# Patient Record
Sex: FEMALE | Race: WHITE | Employment: FULL TIME
[De-identification: names, ages, dates, MRNs, and addresses within clinical notes are randomized per-mention and may not be internally consistent; named-entity substitution may affect disease eponyms.]

---

## 2018-01-20 ENCOUNTER — HEALTH MAINTENANCE LETTER (OUTPATIENT)
Age: 38
End: 2018-01-20

## 2019-09-29 ENCOUNTER — HEALTH MAINTENANCE LETTER (OUTPATIENT)
Age: 39
End: 2019-09-29

## 2020-03-15 ENCOUNTER — HEALTH MAINTENANCE LETTER (OUTPATIENT)
Age: 40
End: 2020-03-15

## 2020-10-09 ENCOUNTER — HOSPITAL ENCOUNTER (EMERGENCY)
Facility: CLINIC | Age: 40
Discharge: HOME | End: 2020-10-09
Attending: EMERGENCY MEDICINE | Admitting: EMERGENCY MEDICINE
Payer: COMMERCIAL

## 2020-10-09 VITALS
HEIGHT: 64 IN | HEART RATE: 79 BPM | OXYGEN SATURATION: 99 % | TEMPERATURE: 98.7 F | BODY MASS INDEX: 30.73 KG/M2 | DIASTOLIC BLOOD PRESSURE: 72 MMHG | WEIGHT: 180 LBS | RESPIRATION RATE: 10 BRPM | SYSTOLIC BLOOD PRESSURE: 113 MMHG

## 2020-10-09 DIAGNOSIS — I49.1 PAC (PREMATURE ATRIAL CONTRACTION): ICD-10-CM

## 2020-10-09 DIAGNOSIS — R00.2 PALPITATIONS: ICD-10-CM

## 2020-10-09 LAB
ALBUMIN SERPL-MCNC: 3.6 G/DL (ref 3.4–5)
ALP SERPL-CCNC: 81 U/L (ref 40–150)
ALT SERPL W P-5'-P-CCNC: 19 U/L (ref 0–50)
ANION GAP SERPL CALCULATED.3IONS-SCNC: 5 MMOL/L (ref 3–14)
AST SERPL W P-5'-P-CCNC: 10 U/L (ref 0–45)
BASOPHILS # BLD AUTO: 0 10E9/L (ref 0–0.2)
BASOPHILS NFR BLD AUTO: 0.2 %
BILIRUB SERPL-MCNC: 0.2 MG/DL (ref 0.2–1.3)
BUN SERPL-MCNC: 11 MG/DL (ref 7–30)
CALCIUM SERPL-MCNC: 8.9 MG/DL (ref 8.5–10.1)
CHLORIDE SERPL-SCNC: 106 MMOL/L (ref 94–109)
CO2 SERPL-SCNC: 28 MMOL/L (ref 20–32)
CREAT SERPL-MCNC: 0.94 MG/DL (ref 0.52–1.04)
DIFFERENTIAL METHOD BLD: ABNORMAL
EOSINOPHIL # BLD AUTO: 0.2 10E9/L (ref 0–0.7)
EOSINOPHIL NFR BLD AUTO: 1.6 %
ERYTHROCYTE [DISTWIDTH] IN BLOOD BY AUTOMATED COUNT: 14.6 % (ref 10–15)
GFR SERPL CREATININE-BSD FRML MDRD: 76 ML/MIN/{1.73_M2}
GLUCOSE SERPL-MCNC: 100 MG/DL (ref 70–99)
HCT VFR BLD AUTO: 37 % (ref 35–47)
HGB BLD-MCNC: 11.6 G/DL (ref 11.7–15.7)
IMM GRANULOCYTES # BLD: 0 10E9/L (ref 0–0.4)
IMM GRANULOCYTES NFR BLD: 0.2 %
INTERPRETATION ECG - MUSE: NORMAL
INTERPRETATION ECG - MUSE: NORMAL
LYMPHOCYTES # BLD AUTO: 3.1 10E9/L (ref 0.8–5.3)
LYMPHOCYTES NFR BLD AUTO: 31.3 %
MCH RBC QN AUTO: 25.7 PG (ref 26.5–33)
MCHC RBC AUTO-ENTMCNC: 31.4 G/DL (ref 31.5–36.5)
MCV RBC AUTO: 82 FL (ref 78–100)
MONOCYTES # BLD AUTO: 0.5 10E9/L (ref 0–1.3)
MONOCYTES NFR BLD AUTO: 5.1 %
NEUTROPHILS # BLD AUTO: 6 10E9/L (ref 1.6–8.3)
NEUTROPHILS NFR BLD AUTO: 61.6 %
NRBC # BLD AUTO: 0 10*3/UL
NRBC BLD AUTO-RTO: 0 /100
PLATELET # BLD AUTO: 252 10E9/L (ref 150–450)
POTASSIUM SERPL-SCNC: 3.8 MMOL/L (ref 3.4–5.3)
PROT SERPL-MCNC: 7.4 G/DL (ref 6.8–8.8)
RBC # BLD AUTO: 4.52 10E12/L (ref 3.8–5.2)
SODIUM SERPL-SCNC: 139 MMOL/L (ref 133–144)
TROPONIN I SERPL-MCNC: <0.015 UG/L (ref 0–0.04)
WBC # BLD AUTO: 9.8 10E9/L (ref 4–11)

## 2020-10-09 PROCEDURE — 93005 ELECTROCARDIOGRAM TRACING: CPT

## 2020-10-09 PROCEDURE — 85025 COMPLETE CBC W/AUTO DIFF WBC: CPT | Performed by: EMERGENCY MEDICINE

## 2020-10-09 PROCEDURE — 99285 EMERGENCY DEPT VISIT HI MDM: CPT

## 2020-10-09 PROCEDURE — 84484 ASSAY OF TROPONIN QUANT: CPT | Performed by: EMERGENCY MEDICINE

## 2020-10-09 PROCEDURE — 80053 COMPREHEN METABOLIC PANEL: CPT | Performed by: EMERGENCY MEDICINE

## 2020-10-09 PROCEDURE — 93005 ELECTROCARDIOGRAM TRACING: CPT | Mod: 76

## 2020-10-09 ASSESSMENT — ENCOUNTER SYMPTOMS
VOMITING: 0
SHORTNESS OF BREATH: 1
CHILLS: 0
LIGHT-HEADEDNESS: 1
COUGH: 0
SORE THROAT: 0
PALPITATIONS: 1
DIARRHEA: 0
DIAPHORESIS: 0
NAUSEA: 0
FEVER: 0

## 2020-10-09 ASSESSMENT — MIFFLIN-ST. JEOR: SCORE: 1471.47

## 2020-10-09 NOTE — ED TRIAGE NOTES
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 [place] irregular heart beat since mid night and getting worse, feeling short at breath at times.

## 2020-10-09 NOTE — ED AVS SNAPSHOT
[facility] Emergency Dept  [address] [address]  Phone: [phone]  Fax: [phone]                                    Renita Lee   MRN: [id number]    Department: [facility] Emergency Dept   Date of Visit: 10/9/2020           After Visit Summary Signature Page    I have received my discharge instructions, and my questions have been answered. I have discussed any challenges I see with this plan with the nurse or doctor.    ..........................................................................................................................................  Patient/Patient Representative Signature      ..........................................................................................................................................  Patient Representative Print Name and Relationship to Patient    ..................................................               ................................................  Date                                   Time    ..........................................................................................................................................  Reviewed by Signature/Title    ...................................................              ..............................................  Date                                               Time          22EPIC Rev 08/18

## 2020-10-09 NOTE — ED PROVIDER NOTES
History     Chief Complaint:  Irregular heartbeat    The history is provided by the patient.     Renita Juarez is a 40 year old female with a history of narcolepsy, controlled by medications for the last 5 years, who presents for evaluation of heart palpitations about 7 hours prior to arrival.  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  Patient reports that she was able to listen to her own heart while on her shift and found a normal rhythm with shorts skips, about every fifth beat, and then it returns back to normal.  She states that it makes her feel light headed and faint in addition to short of breath, especially as she walks up stairs. Her worsening palpitations ultimately prompted her to seek evaluation in the ED after she was done working.    Here, at rest, the patient denies any symptoms.    Allergies:  Ragweeds  Trazodone  Imidazole Antifungals  Penicillins  Percocet   Sulfa Drugs    Medications:   Amitriptyline  Bupropion  Celexa  Levothyroxine  Metformin  Phentermine  Valtrex   Protonix    Medical History:   Allergic rhinitis  Anxiety  Asthma  Depressive disorder  Narcolepsy with cataplexy   Bulimia  Esophageal reflux  Pre-diabetes  Temporomandibular joint disorder  Esophagitis  Hypothyroidism  Metrorrhagia  LGSIL  Dysplasia cervix, low grade     Surgical History   Cholecystectomy, laparoscopic  D & C  Tonsillectomy  Tubal ligation    Family History:   Mother:  Type 2 diabetes    Social History:  Patient was not accompanied to the ED.  Smoking Status: Negative  Smokeless Tobacco: Negative   Alcohol Use: Negative  Drug Use: Negative   Primary Physician: Fidencio Sommer    Review of Systems   Constitutional: Negative for chills, diaphoresis and fever.   HENT: Negative for congestion and sore throat.    Respiratory: Positive for shortness of breath. Negative for  "cough.    Cardiovascular: Positive for palpitations. Negative for chest pain.   Gastrointestinal: Negative for diarrhea, nausea and vomiting.   Neurological: Positive for light-headedness.   All other systems reviewed and are negative.        Physical Exam     Patient Vitals for the past 24 hrs:   BP Temp Temp src Pulse Resp SpO2 Height Weight   10/09/20 0820 113/72 -- -- 79 10 99 % -- --   10/09/20 0800 -- -- -- 68 10 98 % -- --   10/09/20 0708 121/77 98.7  F (37.1  C) Oral 83 16 96 % 1.626 m (5' 4\") 81.6 kg (180 lb)        Physical Exam   Vitals: reviewed by me  General: Pt seen on hospital [facility], pleasant, cooperative, and alert to conversation  Eyes: Tracking well, clear conjunctiva BL  ENT: MMM, midline trachea.   Lungs:  No tachypnea, no accessory muscle use. No respiratory distress.   CV: Rate as above, regular rhythm.    Abd: Soft, non tender, no guarding, no rebound. Non distended  MSK: no peripheral edema or joint effusion.  No evidence of trauma  Skin: No rash, normal turgor and temperature  Neuro: Clear speech and no facial droop.  Psych: Not RIS, no e/o AH/VH      Emergency Department Course     ECG:  ECG taken at 0710, ECG read at 0713  Normal sinus rhythm  Normal ECG  Rate 84 bpm. ND interval 156 ms. QRS duration 84 ms. QT/QTc 384/453 ms. P-R-T axes 67 34 37.     ECG #2:  ECG taken at 0803, ECG read at 0806  Normal sinus rhythm  Normal ECG  Rate 72 bpm. ND interval 162 ms. QRS duration 84 ms. QT/QTc 418/457 ms. P-R-T axes 71 44 40.    Laboratory:  Laboratory findings were communicated with the patient who voiced understanding of the findings.    CBC: WBC 9.8, HGB 11.6 (L),    CMP: Glucose 100 (H), o/w WNL (Creatinine 0.94)    Troponin: (Collected 0711) <0.015    Emergency Department Course:     Nursing notes and vitals reviewed.    0710 I performed an exam of the patient as documented above.      Blood was drawn for laboratory testing, results above.     EKG obtained in the ED, see results " above.     0806 Findings and plan explained to the Patient. Patient discharged home with instructions regarding supportive care, medications, and reasons to return. The importance of close follow-up was reviewed.    Impression & Plan     Medical Decision Making:  Renita Juarez, a 40 year old female, presents to ED with palpitations.  It appears as though she is having PAC's on the monitor, but we were unable to capture these on an EKG.  She has normal labs, no syncopal episodes, and her symptoms do match very clearly with those of PAC's as well.  We went over what she should do, who she should follow up with, and red flags for when she should come back to ER.  She is reassured by this, and understands that she needs to see her regular doctor as this should be followed.  Patient's ok with this plan.  Will discharge.    Diagnosis:     ICD-10-CM    1. PAC (premature atrial contraction)  I49.1    2. Palpitations  R00.2         Disposition:  Discharged to home.    Scribe Disclosure:  I, Sudha Deal, am serving as a scribe at 7:05 AM on 10/9/2020 to document services personally performed by Guille Ruiz MD based on my observations and the provider's statements to me.     I, Georgie Johnston, am serving as a scribe on 10/9/2020 at 7:55 AM to personally document services performed by Guille Ruiz MD, based on my observations and the provider's statements to me.     [place] Guille Ryan MD  10/09/20 [id number]

## 2021-01-14 ENCOUNTER — HEALTH MAINTENANCE LETTER (OUTPATIENT)
Age: 41
End: 2021-01-14

## 2021-06-02 ENCOUNTER — RECORDS - HEALTHEAST (OUTPATIENT)
Dept: ADMINISTRATIVE | Facility: CLINIC | Age: 41
End: 2021-06-02

## 2021-10-24 ENCOUNTER — HEALTH MAINTENANCE LETTER (OUTPATIENT)
Age: 41
End: 2021-10-24

## 2022-02-13 ENCOUNTER — HEALTH MAINTENANCE LETTER (OUTPATIENT)
Age: 42
End: 2022-02-13

## 2022-10-15 ENCOUNTER — HEALTH MAINTENANCE LETTER (OUTPATIENT)
Age: 42
End: 2022-10-15

## 2023-03-26 ENCOUNTER — HEALTH MAINTENANCE LETTER (OUTPATIENT)
Age: 43
End: 2023-03-26

## 2024-03-17 ENCOUNTER — HEALTH MAINTENANCE LETTER (OUTPATIENT)
Age: 44
End: 2024-03-17